# Patient Record
Sex: MALE | Race: WHITE
[De-identification: names, ages, dates, MRNs, and addresses within clinical notes are randomized per-mention and may not be internally consistent; named-entity substitution may affect disease eponyms.]

---

## 2018-05-29 ENCOUNTER — HOSPITAL ENCOUNTER (OUTPATIENT)
Dept: HOSPITAL 11 - JP.SDS | Age: 48
End: 2018-05-29
Attending: SURGERY
Payer: COMMERCIAL

## 2018-05-29 DIAGNOSIS — R10.13: Primary | ICD-10-CM

## 2018-05-29 DIAGNOSIS — R14.0: ICD-10-CM

## 2018-05-29 DIAGNOSIS — K29.70: ICD-10-CM

## 2018-05-29 PROCEDURE — 87081 CULTURE SCREEN ONLY: CPT

## 2018-05-29 PROCEDURE — 43239 EGD BIOPSY SINGLE/MULTIPLE: CPT

## 2018-06-01 NOTE — OR
DATE OF PROCEDURE:  05/29/2018

 

PREOPERATIVE DIAGNOSIS:  Epigastric pain and bloating.

 

POSTOPERATIVE DIAGNOSIS:  Very mild antral gastritis.

 

OPERATIVE PROCEDURE:  Esophagogastroduodenoscopy with antral biopsies for CLOtest.

 

ANESTHESIA:  IV sedation.

 

INDICATIONS FOR PROCEDURE:  This is a 47-year-old with probably several-year history of some

episodic upper abdominal pain.  This radiates to the back and appears to be related to

certain foods; the patient seems to think pork for instance causes some problems with regard

to these symptoms.  He had a gallbladder ultrasound, which was negative, and the plan at

this point is to proceed with upper GI endoscopy with biopsies as indicated.  Potential

risks including bleeding and perforation were discussed, and the patient wishes to proceed.

 

PROCEDURE DETAILS:  The patient was taken to the operating room and placed in a left lateral

decubitus position.  IV sedation was administered, after which the upper GI endoscope was

passed orally through the length of the esophagus, into the stomach with retroflexion view

of the fundus, thereafter through the pyloric channel into the junction of the third and

fourth portions of the duodenum.

 

Findings included normal hypopharynx, larynx, upper esophageal sphincter, and esophageal

body.  At the EG junction, no significant hiatal hernia was present nor was there any upward

extension of the gastroesophageal junction mucosal line and no stricturing or other evidence

of neoplasia.  Within the stomach, there was some very mild redness in patches in the antrum

without erosions or ulcers.  The pyloric channel and proximal duodenum likewise had some

patchy area of redness without erosions or ulcers, and the findings normalized beyond the

duodenal bulb.

 

At this point, biopsies were obtained from the antrum to check for the patient's H pylori

status.  Minimal bleeding from the biopsy sites was seen and the procedure then concluded.

 

Given the relatively normal upper GI endoscopic findings and the episodic nature of the

patient's discomfort and bloating symptoms, we will check a CCK-stimulated HIDA scan, we

will obtain this later this week, and if that is negative, we probably would empirically

treat him with some Protonix and see if that is helpful given the mild degree of gastritis

present.

 

 

 

 

Abilio Bruce MD

DD:  06/01/2018 06:25:40

DT:  06/01/2018 08:04:24

Job #:  [id number]

## 2018-06-01 NOTE — PN
DATE OF SERVICE:  05/31/2018

 

Mr. Delcid had a CCK-stimulated HIDA scan today.  This showed an 84% ejection fraction.  The

CCK scan injection did cause some nausea and some mild discomfort in the right upper

quadrant, but not a full reproduction of his symptoms that he has and episodes in the

postprandial period.  Given this, I think we will try him on a course of Protonix 40 mg a

day, and we will see him back in about 2 weeks.  We will see if he is still having symptoms

after being on Protonix for a while.  Given the fact that his symptoms are quite classic for

biliary colic and the CCK scan injection did cause some overlap of those symptoms, we would

proceed with a cholecystectomy at that time, but I think it is worthwhile trying to treat

him with a proton pump inhibitor as somewhat of a diagnostic test initially.  We will see

him back in 2 weeks for a recheck.

 

 

 

 

Abilio Bruce MD

DD:  06/01/2018 06:27:08

DT:  06/01/2018 08:28:12

Job #:  [id number]

## 2021-07-14 NOTE — EDM.PDOC
ED HPI GENERAL MEDICAL PROBLEM





- General


Chief Complaint: Neuro Symptoms/Deficits


Stated Complaint: RIGHT SIDE NUMB FACE AND EXTREMITIES


Time Seen by Provider: 07/13/21 21:54


Source of Information: Reports: Patient


History Limitations: Reports: No Limitations





- History of Present Illness


INITIAL COMMENTS - FREE TEXT/NARRATIVE: 





Patient presents emergency room today secondary to an episode of right-sided 

facial numbness right arm numbness and unusable and patient states he was unable

to talk he states that symptoms resolved fairly quickly he thinks within 1 to 2 

minutes stated symptoms occurred around 8 PM tonight patient states he had a 

similar episode approximately 20 years ago when he was told it was a vasovagal 

some situation patient states that episode occurred while they were out on the 

[employer] boat tonight he had just returned from lifting a ladder off the back of 

the boat he was in the sitting position and doing nothing strenuous at that 

time.  At this time patient states he is back to his normal baseline and is 

feeling no residual effects





PMH--GERD


Meds--Protonix


NKDA





Tob--former


EtOH--occassional


Drugs--rare





COVID immunization (J&J)--April 2021


Onset: Today, Sudden





- Related Data


                                    Allergies











Allergy/AdvReac Type Severity Reaction Status Date / Time


 


No Known Allergies Allergy   Verified 07/13/21 21:54











Home Meds: 


                                    Home Meds





Pantoprazole 40 mg PO BEDTIME 07/13/21 [History]











Past Medical History


HEENT History: Reports: Impaired Vision


Gastrointestinal History: Reports: GERD, Other (See Below)


Other Gastrointestinal History: bloating and pain


Dermatologic History: Reports: None





- Infectious Disease History


Infectious Disease History: Reports: Chicken Pox





- Past Surgical History


HEENT Surgical History: Reports: Other (See Below)


Other HEENT Surgeries/Procedures: eye implants for experimental treatment


GI Surgical History: Reports: None


Dermatological Surgical History: Reports: Other (See Below)





Social & Family History





- Tobacco Use


Tobacco Use Status *Q: Never Tobacco User





- Caffeine Use


Caffeine Use: Reports: Coffee, Soda





ED ROS GENERAL





- Review of Systems


Review Of Systems: Comprehensive ROS is negative, except as noted in HPI.


Constitutional: Reports: No Symptoms


HEENT: Reports: No Symptoms


Respiratory: Reports: No Symptoms


Cardiovascular: Reports: No Symptoms


Endocrine: Reports: No Symptoms


GI/Abdominal: Reports: No Symptoms


: Reports: No Symptoms


Musculoskeletal: Reports: No Symptoms


Skin: Reports: No Symptoms


Neurological: Reports: Numbness, Paresthesia, Trouble Speaking, Weakness.  

Denies: Dizziness, Headache


Psychiatric: Reports: No Symptoms


Hematologic/Lymphatic: Reports: No Symptoms


Immunologic: Reports: No Symptoms





ED EXAM, NEURO





- Physical Exam


Exam: See Below


Exam Limited By: No Limitations


General Appearance: Alert, WD/WN, No Apparent Distress


Eye Exam: Bilateral Eye: EOMI, Normal Inspection, PERRL


Ears: Normal External Exam


Nose: Normal Inspection


Throat/Mouth: Normal Inspection, Normal Lips, Normal Teeth, Normal Voice, No 

Airway Compromise


Head Exam: Atraumatic, Normocephalic


Neck: Normal Inspection, Supple, Non-Tender, Full Range of Motion


Respiratory/Chest: No Respiratory Distress, Lungs Clear, Normal Breath Sounds, 

Chest Non-Tender


Cardiovascular: Normal Peripheral Pulses, Regular Rate, Rhythm, No Edema, No 

Murmur


GI/Abdominal: Normal Bowel Sounds, Soft, Non-Tender


 (Male) Exam: Deferred


Rectal (Males) Exam: Deferred


Neurological: Alert, Normal Mood/Affect, Normal Dorsiflexion, CN II-XII Intact, 

Normal Plantar Flexion, No Motor/Sensory Deficits, Oriented x 3.  No: Abnormal 

Finger to Nose


Back Exam: Normal Inspection, Full Range of Motion


Extremities: Normal Inspection, Normal Range of Motion, Normal Capillary Refill


Psychiatric: Normal Affect, Normal Mood


Skin Exam: Warm, Dry, Intact, Normal Color, No Rash


  ** #1 Interpretation


EKG Date: 07/14/21


Time: 00:30 (Read by physician at 00 37 no STEMI)


Rhythm: NSR (Jugular rate is 62)


Rate (Beats/Min): 62


Axis: Normal


P-Wave: Present (TN interval 174)


QRS: Normal (URS duration is 98)


ST-T: Normal


QT: Normal (QT/QTc 422/429)


EKG Interpretation Comments: 





Normal sinus rhythm normal EKG





Course





- Vital Signs


Last Recorded V/S: 


                                Last Vital Signs











Temp  97.7 F   07/13/21 21:55


 


Pulse  71   07/13/21 22:44


 


Resp  17   07/13/21 22:44


 


BP  104/73   07/13/21 22:44


 


Pulse Ox  98   07/13/21 21:55














- Orders/Labs/Meds


Orders: 


                               Active Orders 24 hr











 Category Date Time Status


 


 EKG Documentation Completion [RC] ASDIRECTED Care  07/13/21 23:20 Active


 


 EKG 12 Lead [EK] Routine Ther  07/13/21 23:19 Ordered














- Radiology Interpretation


CT Results Date: 07/14/21


CT Results Time: 00:20 (Luminary reading by this physician no acute findings 

noted on head CT final radiology reading is pending)





Departure





- Departure


Time of Disposition: 00:51


Disposition: Home, Self-Care 01


Condition: Good


Clinical Impression: 


 TIA (transient ischemic attack)








- Discharge Information


*PRESCRIPTION DRUG MONITORING PROGRAM REVIEWED*: Not Applicable


*COPY OF PRESCRIPTION DRUG MONITORING REPORT IN PATIENT MICHAEL: Not Applicable


Instructions:  Transient Ischemic Attack, Easy-to-Read


Referrals: 


Moise Nunez MD [Primary Care Provider] - 


Forms:  ED Department Discharge


Additional Instructions: 


Follow-up with your primary care provider in the next 1 to 2 days for further 

evaluation and management is strongly recommended





Sepsis Event Note (ED)





- Evaluation


Sepsis Screening Result: No Definite Risk





- Focused Exam


Vital Signs: 


                                   Vital Signs











  Temp Pulse Resp BP Pulse Ox


 


 07/13/21 22:44   71  17  104/73 


 


 07/13/21 21:55  97.7 F  71  16  129/87  98


 


 07/13/21 21:45  97.7 F  71  16  129/87  98














- My Orders


Last 24 Hours: 


My Active Orders





07/13/21 23:19


EKG 12 Lead [EK] Routine 





07/13/21 23:20


EKG Documentation Completion [RC] ASDIRECTED 














- Assessment/Plan


Last 24 Hours: 


My Active Orders





07/13/21 23:19


EKG 12 Lead [EK] Routine 





07/13/21 23:20


EKG Documentation Completion [RC] ASDIRECTED

## 2021-07-14 NOTE — CRLCT
For Patients:  As a result of the 21st Century Cures Act, medical imaging 

exams and procedure reports are released immediately into your electronic 

medical record.  You may view this report before your referring provider.  

If you have questions, please contact your health care provider.



INDICATION:



Right-sided weakness, speech difficulty



TECHNIQUE:



Head CT without contrast.



COMPARISON:



None 



FINDINGS:



CSF spaces: Within normal limits for age.  



Brain parenchyma: Normal gray-white junction. No sign of mass, hemorrhage, 

or midline shift.  



Skull base and calvarium: The visualized paranasal sinuses and mastoid air 

cells demonstrate no acute or significant findings.  The visualized orbits 

are grossly unremarkable.  No skull fractures.   



IMPRESSION:



No acute abnormality.



Please note that all CT scans at this facility use dose modulation, 

iterative reconstruction, and/or weight-based dosing when appropriate to 

reduce radiation dose to as low as reasonably achievable.



Dictated by Rae Cuellar MD @ 7/14/2021 12:21:55 AM



Signed by Dr. Rae Cuellar @ Jul 14 2021 12:21AM

## 2021-08-30 NOTE — OR
DATE OF PROCEDURE:  08/30/2021

 

SURGEON:  Ugo Rahman MD

 

PROCEDURE:  Colonoscopy.

 

FINDINGS:  Sigmoid colon polyp, approximately 5 mm, completely removed using cold biopsy

forceps.

 

COMPLICATIONS:  None.

 

ASSISTANT:  None.

 

ANESTHESIA:  MAC.

 

PREOPERATIVE DIAGNOSIS:  Screening colonoscopy.

 

POSTOPERATIVE DIAGNOSIS:  Screening colonoscopy.

 

RISKS:  Risks, benefits, alternatives, and limitations including, but not limited to

infection, bleeding, perforation, false positives and false negatives were explained to the

patient and he wished to proceed.

 

PROCEDURE IN DETAIL:  The patient was placed in left lateral decubitus position.  Digital

rectal exam was performed without abnormality.  Scope was introduced and advanced

atraumatically to the ileocecal valve.  A photo was taken of the appendiceal orifice.

 

Scope was brought back to the ascending, transverse, descending colon, and retroflexed.

 

No evidence of old or new blood.  The aforementioned polyp was identified and completely

removed.

 

No diverticulosis.

 

No abnormalities on retroflexion.

 

No colitis.

 

Greater than 8 minutes was spent removing the scope.

 

The prep was acceptable, approximately 90% of the luminal surface could be seen.

 

 

 

 

Ugo Rahman MD

DD:  08/30/2021 08:34:10

DT:  08/30/2021 09:31:02

Job #:  [id number]